# Patient Record
Sex: MALE | Race: ASIAN | NOT HISPANIC OR LATINO | URBAN - METROPOLITAN AREA
[De-identification: names, ages, dates, MRNs, and addresses within clinical notes are randomized per-mention and may not be internally consistent; named-entity substitution may affect disease eponyms.]

---

## 2020-04-22 ENCOUNTER — COMMUNICATION - HEALTHEAST (OUTPATIENT)
Dept: ADMINISTRATIVE | Facility: CLINIC | Age: 30
End: 2020-04-22

## 2020-04-22 ENCOUNTER — OFFICE VISIT - HEALTHEAST (OUTPATIENT)
Dept: OTOLARYNGOLOGY | Facility: CLINIC | Age: 30
End: 2020-04-22

## 2020-04-22 DIAGNOSIS — R22.1 NECK SWELLING: ICD-10-CM

## 2021-06-07 NOTE — TELEPHONE ENCOUNTER
Called patients' wife back. Appointment scheduled for today with Dr. Marrero.    Tashia Garces RN  [facility]  [phone]

## 2021-06-07 NOTE — PROGRESS NOTES
HISTORY OF PRESENT ILLNESS  Patient with a right jaw swelling. Seen in the ED and diagnosed with either ranula, salivary gland abscess or infected ranula. Patient has had pain. No fever, sweats or chills. Swelling of the right jaw is noticeable. No dental issues in the mandible.     REVIEW OF SYSTEMS  Review of Systems: a 10-system review was performed. Pertinent positives are noted in the HPI and on a separate scanned document in the chart.    PMH, PSH, FH and SH has documented in the EHR.      EXAM    CONSTITUTIONAL  General Appearance:  Normal, well developed, well nourished, no obvious distress  Ability to Communicate:  communicates appropriately.    HEAD AND FACE  Appearance and Symmetry:  Normal, no scalp or facial scarring or suspicious lesions.  Paranasal sinuses tenderness:  Normal, Paranasal sinuses non tender    EARS  Clinical speech reception threshold:  Normal, able to hear normal speech.  Auricle:  Normal, Auricles without scars, lesions, masses.  External auditory canal:  Normal, External auditory canal normal.  Tympanic membrane:  Normal, Tympanic membranes normal without swelling or erythema.  Tympanic membrane mobility:  Normal, Normal tympanic membrane mobility.    NOSE (speculum or scope)  Architecture:  Normal, Grossly normal external nasal architecture with no masses or lesions.  Mucosa:  Normal mucosa, No polyps or masses.  Septum:  Normal, Septum non-obstructing.  Turbinates:  Normal, No turbinate abnormalities    ORAL CAVITY AND OROPHARYNX  Lips:  Normal.  Dental and gingiva:  Normal, No obvious dental or gingival disease.  Mucosa:  Normal, Moist mucous membranes.  Tongue:  Normal, Tongue mobile with no mucosal abnormalities  Hard and soft palate:  Normal, Hard and soft palate without cleft or mucosal lesions.  Oral pharynx:  Normal, Posterior pharynx without lesions or remarkable asymmetry.  Saliva:  Normal, Clear saliva.  Masses:  Normal, No palpable masses or pathologically enlarged lymph  nodes.    NECK  Masses/lymph nodes:  Normal, No worrisome neck masses or lymph nodes.  Salivary glands: Swelling right submandibular triangle.  Trachea and larynx position:  Normal, Trachea and larynx midline.  Thyroid:  Normal, No thyroid abnormality.  Tenderness:  Normal, No cervical tenderness.  Suppleness:  Normal, Neck supple    NEUROLOGICAL  Speech pattern:  Normal, Proasaic    RESPIRATORY  Symmetry and Respiratory effort:  Normal, Symmetric chest movement and expansion with no increased intercostal retractions or use of accessory muscles.     CT SOFT TISSUE NECT  Reviewed images and report.     IMPRESSION  Fluid collection right submandibular triangle which is anterior and inferior to the gland.     RECOMMENDATION  I discussed aspiration of the area with the patient and he agreed. The area was infiltrated with lidocaine plus epinephrine solution. After allowing adequate time for anesthesia an 18 gauge needle on a 10 cc syringe was used to enter the area and about 7 cc of brown liquid serous liduid was removed. Patient is currently on antibiotics. I advised that he finish and follow up if symptoms do not completely resolve. I discussed the difference between cyst vs infeciton . Patient expressed understanding.     Kevin Marrero MD

## 2021-06-07 NOTE — TELEPHONE ENCOUNTER
Pt was seen at  ED on 4/21 and was told to schedule a clinic appt today. Please call pt to let him know if ok to be seen today by Dr. Marrero. Pt states that he does not need an . He can be reached at [phone].

## 2021-07-02 ENCOUNTER — HOSPITAL ENCOUNTER (EMERGENCY)
Dept: EMERGENCY MEDICINE | Facility: HOSPITAL | Age: 31
Discharge: HOME | End: 2021-07-02
Attending: EMERGENCY MEDICINE

## 2021-07-02 DIAGNOSIS — S93.401A SPRAIN OF RIGHT ANKLE, UNSPECIFIED LIGAMENT, INITIAL ENCOUNTER: ICD-10-CM

## 2021-07-02 RX ORDER — IBUPROFEN 600 MG/1
600 TABLET, FILM COATED ORAL EVERY 6 HOURS PRN
Qty: 20 TABLET | Refills: 0 | Status: SHIPPED | OUTPATIENT
Start: 2021-07-02

## 2021-07-04 NOTE — ED PROVIDER NOTES
ED Provider Notes by Carmelo Chambers MD at 7/2/2021 10:53 PM     Author: Carmelo Chambers MD Service: Emergency Medicine Author Type: Physician    Filed: 7/3/2021  5:25 AM Date of Service: 7/2/2021 10:53 PM Status: Signed    : Carmelo Chambers MD (Physician)       EMERGENCY DEPARTMENT ENCOUNTER    NAME: Catarino Kirby  AGE: 30 y.o. male  YOB: 1990  MRN: [id number]  EVALUATION DATE & TIME: 7/2/2021 10:32 PM    PCP: Provider, No Primary Care    Carmelo Chambers M.D.    Chief Complaint   Patient presents with   ? Ankle Pain       FINAL IMPRESSION:  1. Sprain of right ankle, unspecified ligament, initial encounter        ED COURSE & MEDICAL DECISION MAKING:    Pertinent Labs & Imaging studies reviewed. (See chart for details)  Catarino Kirby is a 30 y.o. male who presents to the ER for right ankle injury. Initial vitals reviewed. Exam notable for tenderness over the distal fibular tip with anterior ligament.       Differential includes but is not limited to ankle sprain, distal fibular fracture, mortise disruption, fifth metatarsal fracture.  Swelling and tenderness over the distal fibular region to anterior talofibular ligament.  X-rays obtained and patient given ice and ibuprofen.  X-ray showed no fracture and likely sprain of the talofibular ligament.  Patient given recommendations for rest, ice, elevation and compression and will be placed in a walking boot however recommended for crutch use for 1 week before returning to walking on the boot with transition to normal care as tolerated.  Patient is also recommended follow-up with orthopedics in a week to recheck ligamentous healing before full removal of the boot for ambulation.    10:53 PM I met with the patient to gather history and to perform my initial exam. I discussed the plan for care while in the Emergency Department. PPE (gloves, eye protection, and surgical mask) was worn by me during patient encounters while patient wore mask.  "  11:02 PM I re-evaluated patient and updated him on results. We discussed plans for discharge and patient is agreeable.    Prior to disposition, all patient concerns were addressed and opportunity to ask additional questions was given.  Patient was comfortable with this treatment plan and expressed understanding of all instructions.  At the conclusion of the encounter I discussed the results of all of the tests and the disposition. The questions were answered. The patient or family acknowledged understanding and was agreeable with the care plan.     0 minutes of critical care time excluding procedures.     MEDICATIONS GIVEN IN THE EMERGENCY:  Medications   ibuprofen tablet 600 mg (ADVIL,MOTRIN) (600 mg Oral Given 7/2/21 2249)       NEW PRESCRIPTIONS STARTED AT TODAY'S ER VISIT  Discharge Medication List as of 7/2/2021 11:29 PM      START taking these medications    Details   ibuprofen (ADVIL,MOTRIN) 600 MG tablet Take 1 tablet (600 mg total) by mouth every 6 (six) hours as needed for pain., Starting Fri 7/2/2021, Print         CONTINUE these medications which have NOT CHANGED    Details   HYDROcodone-acetaminophen 5-325 mg per tablet Take 1 tablet by mouth every 4 (four) hours as needed for pain., Starting Tue 4/21/2020, Print              =================================================================    HPI    Patient information was obtained from: Patient    Use of : N/A     Catarino Kirby is a 30 y.o. male without pertinent history who presents for evaluation of right ankle pain.     Patient reports earlier today he was playing soccer in sandals and twisted his right ankle with his foot turning inwards. He states he heard a mild \"pop\" and endorses pain to right lateral ankle. No pain medications taken PTA. Patient denies additional medical concerns or complaints at this time.      REVIEW OF SYSTEMS   Review of Systems   Musculoskeletal:        Positive for right lateral ankle pain.   All other systems " reviewed and are negative.     PAST MEDICAL HISTORY:  History reviewed. No pertinent past medical history.    PAST SURGICAL HISTORY:  History reviewed. No pertinent surgical history.    CURRENT MEDICATIONS:    No current facility-administered medications on file prior to encounter.      Current Outpatient Medications on File Prior to Encounter   Medication Sig   ? HYDROcodone-acetaminophen 5-325 mg per tablet Take 1 tablet by mouth every 4 (four) hours as needed for pain.       ALLERGIES:  No Known Allergies    FAMILY HISTORY:  History reviewed. No pertinent family history.    SOCIAL HISTORY:   Social History     Socioeconomic History   ? Marital status: Single     Spouse name: None   ? Number of children: None   ? Years of education: None   ? Highest education level: None   Occupational History   ? None   Social Needs   ? Financial resource strain: None   ? Food insecurity     Worry: None     Inability: None   ? Transportation needs     Medical: None     Non-medical: None   Tobacco Use   ? Smoking status: Never Smoker   ? Smokeless tobacco: Never Used   Substance and Sexual Activity   ? Alcohol use: None   ? Drug use: None   ? Sexual activity: None   Lifestyle   ? Physical activity     Days per week: None     Minutes per session: None   ? Stress: None   Relationships   ? Social connections     Talks on phone: None     Gets together: None     Attends Rastafarian service: None     Active member of club or organization: None     Attends meetings of clubs or organizations: None     Relationship status: None   ? Intimate partner violence     Fear of current or ex partner: None     Emotionally abused: None     Physically abused: None     Forced sexual activity: None   Other Topics Concern   ? None   Social History Narrative   ? None       VITALS:  Patient Vitals for the past 24 hrs:   BP Temp Temp src Pulse Resp SpO2   07/02/21 2226 120/72 98.6  F (37  C) Oral 69 18 97 %       PHYSICAL EXAM    Physical Exam    Constitutional: He appears well-developed and well-nourished. No distress.   HENT:   Head: Normocephalic and atraumatic.   Cardiovascular: Intact distal pulses.   Pulmonary/Chest: No respiratory distress.   Musculoskeletal:         General: Tenderness present. No deformity or edema.      Right ankle: He exhibits decreased range of motion. Tenderness. Lateral malleolus and AITFL tenderness found.   Neurological: He is alert. He exhibits normal muscle tone. Coordination normal.   Skin: Skin is warm and dry. He is not diaphoretic. No erythema.   Psychiatric: His behavior is normal.   Nursing note and vitals reviewed.       LAB:  All pertinent labs reviewed and interpreted.  No results found for this visit on 07/02/21.    RADIOLOGY:  Reviewed all pertinent imaging. Please see official radiology report.  Xr Ankle Right 3 Or More Vws    Result Date: 7/2/2021  EXAM: XR ANKLE RIGHT 3 OR MORE VWS LOCATION: [facility] DATE/TIME: 7/2/2021 10:50 PM INDICATION: R ankle injury. COMPARISON: None.     Mild soft tissue swelling overlying the lateral malleolus. No fracture or dislocation.    PROCEDURES:   Procedures      I, Tanya Levy, am serving as a scribe to document services personally performed by Dr. Carmelo Chambers MD based on my observation and the provider's statements to me. I, Carmelo Chambers MD attest that Tanya Levy is acting in a scribe capacity, has observed my performance of the services and has documented them in accordance with my direction.    Carmelo Chambers M.D.  Emergency Medicine  [facility] EMERGENCY DEPARTMENT  [address]  [facility] [address]  Dept: [phone]  Loc: [phone]     Carmelo Chambers MD  07/03/21 0582

## 2021-07-04 NOTE — ED TRIAGE NOTES
ED Triage Notes by Golden Rivero, RN at 7/2/2021 10:25 PM     Author: Golden Rivero, RN Service: -- Author Type: Registered Nurse    Filed: 7/2/2021 10:26 PM Date of Service: 7/2/2021 10:25 PM Status: Signed    : Golden Rivero RN (Registered Nurse)       The patient injured his right ankle today.